# Patient Record
Sex: FEMALE
[De-identification: names, ages, dates, MRNs, and addresses within clinical notes are randomized per-mention and may not be internally consistent; named-entity substitution may affect disease eponyms.]

---

## 2022-12-01 ENCOUNTER — NURSE TRIAGE (OUTPATIENT)
Dept: OTHER | Facility: CLINIC | Age: 37
End: 2022-12-01

## 2022-12-01 NOTE — TELEPHONE ENCOUNTER
Location of patient:  [facility]  call from Srinath Cardona at [facility] Name: Vitaly Brandon MRN: [id number]    Subjective: Caller states \"She is miscarrying. \"     Current Symptoms: Took cytotec about 0 and bleeding began around [id number]. Denies cramping.    syncopal    Dizziness. Onset: this morning ; gradual    LMP or Due Date: unknown    Weeks of gestation: 6    Fetal Movement: onlyl 11 weeks    Active vaginal bleeding: heavy  # of pads/hour: 2    Vaginal fluid/discharge: thick and large    Contractions: no cramping;     Sexual intercourse in last 2 days: NO     (# of pregnancies): 11   Para (# of live births): 6    Past complications with pregnancy/delivery: 2 c-sections, 3 d & C, 2nd trimester miscarriage    Triage indicates for patient to Call  Now    Care advice provided, patient verbalizes understanding; denies any other questions or concerns; instructed to call back for any new or worsening symptoms.     Patient/caller agrees to calling [facility]  to let them know patient was coming, but nurse Claudetta Failing stated the patient would need to go to ED not L & D.  0822- Called on Call MD Dr. Sridhar Bro to let her know patient was headed to the ED at [facility]      Reason for Disposition   [1] Vaginal bleeding AND [2] pregnant < 20 weeks   [1] SEVERE vaginal bleeding (e.g., soaking 2 pads / hour, large blood clots) AND [2] present 2 or more hours    Protocols used: Pregnancy - Vaginal Bleeding Greater Than 20 Weeks EGA-ADULT-, Pregnancy - Vaginal Bleeding Less Than 20 Weeks EGA-ADULT-AH

## 2022-12-04 ENCOUNTER — NURSE TRIAGE (OUTPATIENT)
Dept: OTHER | Facility: CLINIC | Age: 37
End: 2022-12-04

## 2022-12-04 NOTE — TELEPHONE ENCOUNTER
[facility]    Received call from Jessica Estefania Espinoza at [facility] Name: Nury Crain MRN: [id number]    Subjective: Caller states \"Fever\"     Current Symptoms:   Fever spiked in the last 10 minutes  101.0 measured with an oral thermometer  JOHNS    Patient was recently hospitalized for miscarriage and discharged on Friday. Patient is currently taking Doxycycline. D&C scheduled for Tuesday. Onset: 10 minutes ago    Pain Severity: 0/10    What has been tried: Tylenol    Triage indicates for patient to Home Care  Per patient request, On Call Provider Dr. Michelle Albrecht was called and notified of the situation. He advised patient to take Tylenol for the fever and rest.  If sx worsen or fever is not controlled by 9 pm tonight go to [facility] ED. This writer called the patient and relayed the above information to her; she verbalized understanding. No further questions at this time. Care advice provided, patient verbalizes understanding; denies any other questions or concerns; instructed to call back for any new or worsening symptoms.     Reason for Disposition   [1] Fever AND [2] no signs of serious infection or localizing symptoms (all other triage questions negative)    Protocols used: [facility]

## 2022-12-04 NOTE — TELEPHONE ENCOUNTER
[facility] MRN: [id number]  OB GYN Kim    Pt discharged yesterday from the hospital after taking Cytotec for miscarriage and increased bleeding last Thursday. Pt states  she has to have another Sonogram for retained placenta on Monday per Dr. Deisy Cortés but did not see appt on chart. . Pt states she was calling to make sure appt was made. Pt states she had a 99.1 temp on Friday while in hospital and today 99.7. Message left for office by [facility] team Rose Calle for appt tomorrow.  Pt advised if she had any new/worsening symptoms increased temperature, SOB, racing heart, sweating, pain etc. to call back and speak with nurse triage team.